# Patient Record
Sex: MALE | Race: OTHER | URBAN - METROPOLITAN AREA
[De-identification: names, ages, dates, MRNs, and addresses within clinical notes are randomized per-mention and may not be internally consistent; named-entity substitution may affect disease eponyms.]

---

## 2019-03-27 ENCOUNTER — EMERGENCY (EMERGENCY)
Facility: HOSPITAL | Age: 64
LOS: 1 days | Discharge: HOME | End: 2019-03-27
Attending: EMERGENCY MEDICINE | Admitting: EMERGENCY MEDICINE
Payer: COMMERCIAL

## 2019-03-27 VITALS
DIASTOLIC BLOOD PRESSURE: 106 MMHG | TEMPERATURE: 99 F | RESPIRATION RATE: 16 BRPM | HEART RATE: 75 BPM | WEIGHT: 167.99 LBS | OXYGEN SATURATION: 95 % | SYSTOLIC BLOOD PRESSURE: 161 MMHG

## 2019-03-27 PROCEDURE — 99282 EMERGENCY DEPT VISIT SF MDM: CPT

## 2019-03-27 PROCEDURE — 99284 EMERGENCY DEPT VISIT MOD MDM: CPT

## 2019-03-27 NOTE — ED ADULT NURSE NOTE - NSIMPLEMENTINTERV_GEN_ALL_ED
Implemented All Universal Safety Interventions:  Alger to call system. Call bell, personal items and telephone within reach. Instruct patient to call for assistance. Room bathroom lighting operational. Non-slip footwear when patient is off stretcher. Physically safe environment: no spills, clutter or unnecessary equipment. Stretcher in lowest position, wheels locked, appropriate side rails in place.

## 2019-03-27 NOTE — ED ADULT NURSE NOTE - OBJECTIVE STATEMENT
Received patient awake and alert x 4, presenting to the ED after accidently drinking Clorox. Per wife, she put Clorox in a water bottle to take out with her for laundry and forgot to get rid of the bottle afterwards. Patient states he thought the bottle had water and accidently drank about 8oz of it. Received patient awake and alert x 4, presenting to the ED after accidently drinking Clorox. Per wife, she put Clorox in a water bottle to take out with her for laundry and forgot to get rid of the bottle afterwards. Patient states he thought the bottle had water and accidently drank about 8oz of it. +nausea, and reports a burning sensation in his throat, no dizziness/lightheadedness, no acute distress noted, family at bedside, safety and comfort provided, will continue to monitor.

## 2019-03-27 NOTE — ED ADULT NURSE REASSESSMENT NOTE - NS ED NURSE REASSESS COMMENT FT1
spoke to poison control (Dilan De La Cruz) with Dr Joyner. recommended to Dr Joyner to have patient drink milk and be observed 4 hours and assess thereafter. spoke to poison control (Dilan De La Cruz) with Dr Joyner. recommended to Dr Joyner to have patient drink milk and be observed 4 hours and assess thereafter.  gave patient milk at this time, patient drinking milk . patient denies pain, shortness of breath, chest pain dizziness, headache, blurred vision.

## 2019-03-28 VITALS
RESPIRATION RATE: 16 BRPM | TEMPERATURE: 98 F | SYSTOLIC BLOOD PRESSURE: 130 MMHG | OXYGEN SATURATION: 96 % | HEART RATE: 67 BPM | DIASTOLIC BLOOD PRESSURE: 85 MMHG

## 2019-03-28 NOTE — ED ADULT NURSE REASSESSMENT NOTE - NS ED NURSE REASSESS COMMENT FT1
Patient went home on stable condition ambulatory accompanied by family. Instructed to follow up with Primary Doctor return for any worsening symptoms such vomiting, abdominal pain/ burning..

## 2019-03-28 NOTE — ED PROVIDER NOTE - OBJECTIVE STATEMENT
Pt is a 63 yo male who presents to the ED with a cc of Clorox bleach ingestion.  Pt with no significant past medical history.  He reports that Pt is a 63 yo male who presents to the ED with a cc of Clorox bleach ingestion.  Pt with no significant past medical history.  He reports that earlier today around 9 pm he accidently drank 6 ounces of Clorox Bleach.  He reports that the liquid was in a water bottle and so he thought that he was drinking water.  Initially reported throat irritation with nausea and epigastric discomfort.  Denies fever, chills, V/D/C, CP, SOB, ext numbness or weakness.  Denies SI/HI

## 2019-03-28 NOTE — ED PROVIDER NOTE - CLINICAL SUMMARY MEDICAL DECISION MAKING FREE TEXT BOX
Pt is a 63 yo male who presents to the ED with a cc of Clorox bleach ingestion.  Pt with no significant past medical history.  Ingestion was accidental.  Pt with mild GI upset.  Poison control notified. Pt given milk will observe

## 2019-03-28 NOTE — ED PROVIDER NOTE - PROGRESS NOTE DETAILS
Spoke with Poison control Dr. Dilan De La Cruz.  Reports pt should be observed for 4 hours post ingestion and pt should drink milk for throat irritation recd sign out  patient remaiedn comfortable in ed  follow up with gi advised, patient may benefit with outpatient endoscopy in three weeks

## 2021-11-02 PROBLEM — Z78.9 OTHER SPECIFIED HEALTH STATUS: Chronic | Status: ACTIVE | Noted: 2019-03-27

## 2021-11-02 PROBLEM — Z00.00 ENCOUNTER FOR PREVENTIVE HEALTH EXAMINATION: Status: ACTIVE | Noted: 2021-11-02

## 2021-12-17 DIAGNOSIS — Z78.9 OTHER SPECIFIED HEALTH STATUS: ICD-10-CM

## 2021-12-22 ENCOUNTER — NON-APPOINTMENT (OUTPATIENT)
Age: 66
End: 2021-12-22

## 2021-12-22 ENCOUNTER — APPOINTMENT (OUTPATIENT)
Dept: CARDIOLOGY | Facility: CLINIC | Age: 66
End: 2021-12-22
Payer: COMMERCIAL

## 2021-12-22 VITALS — SYSTOLIC BLOOD PRESSURE: 118 MMHG | DIASTOLIC BLOOD PRESSURE: 70 MMHG

## 2021-12-22 VITALS
BODY MASS INDEX: 29.66 KG/M2 | HEIGHT: 65 IN | OXYGEN SATURATION: 94 % | DIASTOLIC BLOOD PRESSURE: 90 MMHG | HEART RATE: 74 BPM | WEIGHT: 178 LBS | SYSTOLIC BLOOD PRESSURE: 133 MMHG

## 2021-12-22 DIAGNOSIS — R94.31 ABNORMAL ELECTROCARDIOGRAM [ECG] [EKG]: ICD-10-CM

## 2021-12-22 DIAGNOSIS — E78.1 PURE HYPERGLYCERIDEMIA: ICD-10-CM

## 2021-12-22 PROCEDURE — 99204 OFFICE O/P NEW MOD 45 MIN: CPT

## 2021-12-22 PROCEDURE — 93000 ELECTROCARDIOGRAM COMPLETE: CPT

## 2021-12-22 NOTE — DISCUSSION/SUMMARY
[FreeTextEntry1] : 66 year man with a history as listed presents for an initial cardiac evaluation. \par Fran is doing well overall. he was noted to be bradycardic on his physical which was likely related to good cardiovascular conditioning. His EKG did not reveal any significant ischemic changes. He will undergo a treadmill exercise stress test to define exercise tolerance, rule out exertional hypertensive responses, assess for exercise induced arrhythmias and rule out ischemia from obstructive CAD. He will get a 2d echo to assess for any  new structural heart disease, changes in valvular and ventricular function. \par His blood pressure and heart rate are controlled. \par his triglycerides are mildly elevated. He will try to improve his diet. \par Exercise and diet counseling was performed in order to reduce her future cardiovascular risk.\par He will followup with me in 12 months or sooner if necessary.

## 2021-12-22 NOTE — HISTORY OF PRESENT ILLNESS
[FreeTextEntry1] : 66 year old man with a history of  hypertriglyceridemia presents for an initial cardiac evaluation. \par \par At his last physical he had an ekg showing sinus bradycardia. He   denies any chest pain,  PND, orthopnea, lower extremity edema, near syncope, syncope, strokelike symptoms. 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 He denies nay dyspnea on exertion. \par

## 2021-12-28 ENCOUNTER — APPOINTMENT (OUTPATIENT)
Dept: CARDIOLOGY | Facility: CLINIC | Age: 66
End: 2021-12-28
Payer: COMMERCIAL

## 2021-12-28 PROCEDURE — 93306 TTE W/DOPPLER COMPLETE: CPT

## 2024-10-21 ENCOUNTER — EMERGENCY (EMERGENCY)
Facility: HOSPITAL | Age: 69
LOS: 1 days | Discharge: HOME | End: 2024-10-21
Attending: EMERGENCY MEDICINE | Admitting: EMERGENCY MEDICINE
Payer: SELF-PAY

## 2024-10-21 VITALS
TEMPERATURE: 97 F | HEIGHT: 65 IN | DIASTOLIC BLOOD PRESSURE: 97 MMHG | HEART RATE: 70 BPM | RESPIRATION RATE: 18 BRPM | OXYGEN SATURATION: 97 % | SYSTOLIC BLOOD PRESSURE: 188 MMHG | WEIGHT: 169.98 LBS

## 2024-10-21 PROCEDURE — 99283 EMERGENCY DEPT VISIT LOW MDM: CPT | Mod: 25

## 2024-10-21 PROCEDURE — 12002 RPR S/N/AX/GEN/TRNK2.6-7.5CM: CPT

## 2024-10-21 PROCEDURE — 99284 EMERGENCY DEPT VISIT MOD MDM: CPT | Mod: 25

## 2024-10-21 RX ORDER — LIDOCAINE HCL 20 MG/ML
10 AMPUL (ML) INJECTION ONCE
Refills: 0 | Status: COMPLETED | OUTPATIENT
Start: 2024-10-21 | End: 2024-10-21

## 2024-10-21 RX ORDER — CEPHALEXIN 500 MG
1 CAPSULE ORAL
Qty: 28 | Refills: 0
Start: 2024-10-21 | End: 2024-10-27

## 2024-10-21 RX ORDER — POVIDONE-IODINE 10 %
1 SOLUTION, NON-ORAL TOPICAL ONCE
Refills: 0 | Status: COMPLETED | OUTPATIENT
Start: 2024-10-21 | End: 2024-10-21

## 2024-10-21 RX ORDER — BACITRACIN 500 [USP'U]/G
1 OINTMENT TOPICAL ONCE
Refills: 0 | Status: COMPLETED | OUTPATIENT
Start: 2024-10-21 | End: 2024-10-21

## 2024-10-21 RX ADMIN — BACITRACIN 1 APPLICATION(S): 500 OINTMENT TOPICAL at 20:09

## 2024-10-21 RX ADMIN — Medication 10 MILLILITER(S): at 20:08

## 2024-10-21 RX ADMIN — Medication 1 APPLICATION(S): at 20:08

## 2024-10-21 NOTE — ED PROVIDER NOTE - CARE PROVIDER_API CALL
Yesi Mccarty [facility]  Plastic Surgery  [address] [place] [id number]  Phone: [phone]  Fax: [phone]  Follow Up Time: Urgent

## 2024-10-21 NOTE — ED PROVIDER NOTE - ADDITIONAL NOTES AND INSTRUCTIONS:
Spoke with pt to complete preop phone call. Pt scheduled for Carpal Tunnel Release tomorrow (7/7/20) with Dr. Estes. Pt states she has been taking her 81mg ASA every other day- last dose yesterday. Please call pt to advise. Thank you!   please excuse from work until cleared by hand specialist

## 2024-10-21 NOTE — ED ADULT TRIAGE NOTE - CHIEF COMPLAINT QUOTE
laceration to R pinky from work. Pt seen by UC, xray neg for broken bones. denies numbness/tingling pt did get lidocaine injected in R pinky. no active bleeding noted. R hand wrapped with gauze and tape. elevated BP pt is aware and states he does not take his BP medication and refuses to. Pt does not feel he needs his BP medication. denies CP/SOB/HA. Denies n/v/dizziness.

## 2024-10-21 NOTE — ED PROVIDER NOTE - CLINICAL SUMMARY MEDICAL DECISION MAKING FREE TEXT BOX
Patient is a 69-year-old male who presents to the emergency room with a chief complaint of laceration.  Patient with no significant past medical history.  Reports that today at work at around 4 PM his hand was crushed against a wall with a piece of metal and he sustained a laceration to the right hand fifth digit.  Denies any additional injury.  He was initially seen at urgent care where his tetanus was updated and he had x-rays which were reportedly negative for fracture.  He was then sent to the emergency room for concern for deep laceration.  Patient is right-hand dominant.  Denies any numbness or tingling to the digit bleeding controlled at this time has full range of motion.  Again denies any additional injury.  On exam patient is sitting up no acute distress right hand fifth digit there is an irregular 3 cm macerated laceration noted to the proximal volar aspect of the fifth digit.  Full range of motion at the MCP PIP and DIP joint cap refill less than 2 seconds sensation grossly intact positive radial pulse no acute bony deformity.  Patient presenting to the emergency room with hand laceration neuro vastly intact at this time will clean laceration to speak with hand about repair. Patient advised of elevated blood pressure asymptomatic at this time advised to follow-up for recheck with his primary care physician in 1 to 2 days

## 2024-10-21 NOTE — ED ADULT NURSE REASSESSMENT NOTE - NSFALLRISK_ED_ALL_ED
Unable to leave voice message on either patient or  phone  Both mailbox full      Will have another reminder letter sent  
No

## 2024-10-21 NOTE — ED PROVIDER NOTE - CARE PROVIDERS DIRECT ADDRESSES
Your sleep study will be scored and interpreted by one of our physicians who are board certified in sleep medicine.  Within two weeks the results will be sent to the physician who referred you. Your physician should then contact you to go over the results, along with any recommendations. If you do not hear from your physician within two weeks, please call them.   Please return the device back to us tomorrow morning before 10:00 AM in the patient Registration desk.   [email or web address]

## 2024-10-21 NOTE — ED PROVIDER NOTE - SKIN, MLM
irregular 3 cm jagged lac to volar aspect of left 5th digit irregular 3 cm macerated lac to volar aspect of right 5th digit

## 2024-10-21 NOTE — ED PROVIDER NOTE - OBJECTIVE STATEMENT
69-year-old male presents with laceration to left fifth digit that occurred at work today around 4 PM.  Patient was cut by a piece of metal.  No numbness or tingling.  Patient reports full range of motion.  Was seen in urgent care and had negative x-rays for fracture.  Tetanus was updated.  Sent to emergency room to evaluate for tendon injury since laceration was deep.  Patient is right-handed.  Denies other injuries or complaints.  Blood pressure elevated in triage.  Patient states he has been told his blood pressure has been elevated in the past but states he does not believe he needs medications.  Denies headache, dizziness, chest pain, shortness of breath 69-year-old male presents with laceration to right fifth digit that occurred at work today around 4 PM.  Patient was cut by a piece of metal.  No numbness or tingling.  Patient reports full range of motion.  Was seen in urgent care and had negative x-rays for fracture.  Tetanus was updated.  Sent to emergency room to evaluate for tendon injury since laceration was deep.  Patient is right-handed.  Denies other injuries or complaints.  Blood pressure elevated in triage.  Patient states he has been told his blood pressure has been elevated in the past but states he does not believe he needs medications.  Denies headache, dizziness, chest pain, shortness of breath

## 2024-10-21 NOTE — ED PROVIDER NOTE - NSFOLLOWUPINSTRUCTIONS_ED_ALL_ED_FT
Keep clean and dry 24 hours  Keep bandage on until seen by hand specialist tomorrow at 9:45 in [facility].  Call office for [facility] in the morning  Keflex 4 times a day next week    Address is [address] [place], [place]      Finger Laceration    WHAT YOU NEED TO KNOW:    A finger laceration is a deep cut in your skin. Your blood vessels, bones, joints, tendons, or nerves may also be injured.  Tendon Laceration    DISCHARGE INSTRUCTIONS:    Return to the emergency department if:    Your wound comes apart.    Blood soaks through your bandage.    You have severe pain in your finger or hand.    Your finger is pale and cold.    You have sudden trouble moving your finger.    Your swelling suddenly gets worse.    You have red streaks on your skin coming from your wound.  Call your doctor or hand specialist if:    You have new numbness or tingling.    Your finger feels warm, looks swollen or red, and is draining pus.    You have a fever.    You have questions or concerns about your condition or care.  Medicines: You may need any of the following:    Antibiotics help prevent a bacterial infection.    Acetaminophen decreases pain and fever. It is available without a doctor's order. Ask how much to take and how often to take it. Follow directions. Read the labels of all other medicines you are using to see if they also contain acetaminophen, or ask your doctor or pharmacist. Acetaminophen can cause liver damage if not taken correctly.    Prescription pain medicine may be given. Ask your healthcare provider how to take this medicine safely. Some prescription pain medicines contain acetaminophen. Do not take other medicines that contain acetaminophen without talking to your healthcare provider. Too much acetaminophen may cause liver damage. Prescription pain medicine may cause constipation. Ask your healthcare provider how to prevent or treat constipation.    Take your medicine as directed. Contact your healthcare provider if you think your medicine is not helping or if you have side effects. Tell your provider if you are allergic to any medicine. Keep a list of the medicines, vitamins, and herbs you take. Include the amounts, and when and why you take them. Bring the list or the pill bottles to follow-up visits. Carry your medicine list with you in case of an emergency.  Self-care:    Apply ice on your finger for 15 to 20 minutes every hour or as directed. Use an ice pack, or put crushed ice in a plastic bag. Cover it with a towel before you apply it to your skin. Ice helps prevent tissue damage and decreases swelling and pain.    Elevate your hand above the level of your heart as often as you can. This will help decrease swelling and pain. Prop your hand on pillows or blankets to keep it elevated comfortably.    Wear your splint as directed. A splint will decrease movement and stress on your wound. The splint may help your wound heal faster. Ask your healthcare provider how to apply and remove a splint.    Apply ointments to decrease scarring. Do not apply ointments until your healthcare provider says it is okay. You may need to wait until your wound is healed. Ask which ointment to buy and how often to use it.  Wound care:    Do not get your wound wet until your healthcare provider says it is okay. Do not soak your hand in water. Do not go swimming until your healthcare provider says it is okay. When your healthcare provider says it is okay, carefully wash around the wound with soap and water. Let soap and water run over your wound. Gently pat the area dry or allow it to air dry.    Change your bandages when they get wet, dirty, or after washing. Apply new, clean bandages as directed. Do not apply elastic bandages or tape too tightly. Do not put powders or lotions on your wound.    Apply antibiotic ointment as directed. Your healthcare provider may give you antibiotic ointment to put over your wound if you have stitches. If you have Strips-Strips™ over your wound, let them dry up and fall off on their own. If they do not fall off within 14 days, gently remove them. If you have glue over your wound, do not remove or pick at it. If your glue comes off, do not replace it with glue that you have at home.    Check your wound every day for signs of infection. Signs of infection include swelling, redness, or pus.  Follow up with your doctor or hand specialist in 2 days: Write down your questions so you remember to ask them during your visits.

## 2024-10-21 NOTE — ED PROVIDER NOTE - PROGRESS NOTE DETAILS
Spoke with attending on-call hand specialist, Dr. Mccarty.  States appropriate to suture in emergency room.  Will apply Xeroform and he will see in office tomorrow at 945.  Would like 1 week of Keflex.

## 2024-10-21 NOTE — ED PROVIDER NOTE - MUSCULOSKELETAL, MLM
no muscle or joint tenderness. full ROM. able to flex and extend at DIP and DIP joints. no tendon injury visualized

## 2024-10-21 NOTE — ED ADULT NURSE NOTE - OBJECTIVE STATEMENT
Stated he was at work, was working with heavy duty equipment when it smashed against right pinky finger sustaining a deep lac with bleeding. He went to [facility], evaluated, given a tetanus shot and sent in for suture. denies tingling and numbness, able to move fingers

## 2024-10-21 NOTE — ED PROVIDER NOTE - PATIENT PORTAL LINK FT
You can access the FollowMyHealth Patient Portal offered by [facility] by registering at the following website: [email or web address]. By joining [employer]’s FollowMyHealth portal, you will also be able to view your health information using other applications (apps) compatible with our system.

## 2024-10-21 NOTE — ED PROVIDER NOTE - DIFFERENTIAL DIAGNOSIS
Differentials include but not limited to laceration, tendon injury.  X-ray at urgent care showed no evidence of fracture.  Low suspicion for fracture.  No evidence of neurovascular compromise. Differential Diagnosis